# Patient Record
Sex: MALE | Race: WHITE | NOT HISPANIC OR LATINO | URBAN - METROPOLITAN AREA
[De-identification: names, ages, dates, MRNs, and addresses within clinical notes are randomized per-mention and may not be internally consistent; named-entity substitution may affect disease eponyms.]

---

## 2017-09-07 ENCOUNTER — EMERGENCY (EMERGENCY)
Facility: HOSPITAL | Age: 62
LOS: 1 days | Discharge: HOME | End: 2017-09-07
Attending: EMERGENCY MEDICINE | Admitting: EMERGENCY MEDICINE
Payer: MEDICAID

## 2017-09-07 VITALS
RESPIRATION RATE: 17 BRPM | HEART RATE: 60 BPM | SYSTOLIC BLOOD PRESSURE: 116 MMHG | WEIGHT: 175.05 LBS | OXYGEN SATURATION: 96 % | HEIGHT: 68 IN | TEMPERATURE: 99 F | DIASTOLIC BLOOD PRESSURE: 77 MMHG

## 2017-09-07 LAB
ALBUMIN SERPL ELPH-MCNC: 3.4 G/DL — SIGNIFICANT CHANGE UP (ref 3.3–5.2)
ALP SERPL-CCNC: 90 U/L — SIGNIFICANT CHANGE UP (ref 40–120)
ALT FLD-CCNC: 20 U/L — SIGNIFICANT CHANGE UP
AMPHET UR-MCNC: NEGATIVE — SIGNIFICANT CHANGE UP
ANION GAP SERPL CALC-SCNC: 12 MMOL/L — SIGNIFICANT CHANGE UP (ref 5–17)
APAP SERPL-MCNC: <7.5 UG/ML — LOW (ref 10–26)
AST SERPL-CCNC: 20 U/L — SIGNIFICANT CHANGE UP
BARBITURATES UR SCN-MCNC: NEGATIVE — SIGNIFICANT CHANGE UP
BENZODIAZ UR-MCNC: NEGATIVE — SIGNIFICANT CHANGE UP
BILIRUB SERPL-MCNC: 0.2 MG/DL — LOW (ref 0.4–2)
BUN SERPL-MCNC: 15 MG/DL — SIGNIFICANT CHANGE UP (ref 8–20)
CALCIUM SERPL-MCNC: 8.8 MG/DL — SIGNIFICANT CHANGE UP (ref 8.6–10.2)
CHLORIDE SERPL-SCNC: 100 MMOL/L — SIGNIFICANT CHANGE UP (ref 98–107)
CO2 SERPL-SCNC: 27 MMOL/L — SIGNIFICANT CHANGE UP (ref 22–29)
COCAINE METAB.OTHER UR-MCNC: NEGATIVE — SIGNIFICANT CHANGE UP
CREAT SERPL-MCNC: 0.8 MG/DL — SIGNIFICANT CHANGE UP (ref 0.5–1.3)
ETHANOL SERPL-MCNC: <10 MG/DL — SIGNIFICANT CHANGE UP
GLUCOSE SERPL-MCNC: 93 MG/DL — SIGNIFICANT CHANGE UP (ref 70–115)
HCT VFR BLD CALC: 44.5 % — SIGNIFICANT CHANGE UP (ref 42–52)
HGB BLD-MCNC: 14.8 G/DL — SIGNIFICANT CHANGE UP (ref 14–18)
MCHC RBC-ENTMCNC: 29.3 PG — SIGNIFICANT CHANGE UP (ref 27–31)
MCHC RBC-ENTMCNC: 33.3 G/DL — SIGNIFICANT CHANGE UP (ref 32–36)
MCV RBC AUTO: 88.1 FL — SIGNIFICANT CHANGE UP (ref 80–94)
METHADONE UR-MCNC: NEGATIVE — SIGNIFICANT CHANGE UP
OPIATES UR-MCNC: POSITIVE
PCP SPEC-MCNC: SIGNIFICANT CHANGE UP
PCP UR-MCNC: NEGATIVE — SIGNIFICANT CHANGE UP
PLATELET # BLD AUTO: 264 K/UL — SIGNIFICANT CHANGE UP (ref 150–400)
POTASSIUM SERPL-MCNC: 4.6 MMOL/L — SIGNIFICANT CHANGE UP (ref 3.5–5.3)
POTASSIUM SERPL-SCNC: 4.6 MMOL/L — SIGNIFICANT CHANGE UP (ref 3.5–5.3)
PROT SERPL-MCNC: 7.5 G/DL — SIGNIFICANT CHANGE UP (ref 6.6–8.7)
RBC # BLD: 5.05 M/UL — SIGNIFICANT CHANGE UP (ref 4.6–6.2)
RBC # FLD: 12.6 % — SIGNIFICANT CHANGE UP (ref 11–15.6)
SODIUM SERPL-SCNC: 139 MMOL/L — SIGNIFICANT CHANGE UP (ref 135–145)
THC UR QL: POSITIVE
WBC # BLD: 7.8 K/UL — SIGNIFICANT CHANGE UP (ref 4.8–10.8)
WBC # FLD AUTO: 7.8 K/UL — SIGNIFICANT CHANGE UP (ref 4.8–10.8)

## 2017-09-07 PROCEDURE — 80307 DRUG TEST PRSMV CHEM ANLYZR: CPT

## 2017-09-07 PROCEDURE — 99284 EMERGENCY DEPT VISIT MOD MDM: CPT

## 2017-09-07 PROCEDURE — 80053 COMPREHEN METABOLIC PANEL: CPT

## 2017-09-07 PROCEDURE — 85027 COMPLETE CBC AUTOMATED: CPT

## 2017-09-07 PROCEDURE — 36415 COLL VENOUS BLD VENIPUNCTURE: CPT

## 2017-09-07 NOTE — ED ADULT NURSE NOTE - CHIEF COMPLAINT QUOTE
Panel Management Review      Patient has the following on her problem list:     Depression / Dysthymia review  PHQ-9 SCORE 4/29/2016 9/6/2016 4/21/2017   Total Score - - -   Total Score 16 17 17      Patient is due for:  None    Hypertension   Last three blood pressure readings:  BP Readings from Last 3 Encounters:   04/21/17 130/82   09/06/16 134/84   04/29/16 114/72     Blood pressure: MONITOR    HTN Guidelines:  Age 18-59 BP range:  Less than 140/90  Age 60-85 with Diabetes:  Less than 140/90  Age 60-85 without Diabetes:  less than 150/90      Composite cancer screening  Chart review shows that this patient is due/due soon for the following Mammogram  Summary:    Patient is due/failing the following:   MAMMOGRAM    Action needed:   Patient needs referral/order: Mammo    Type of outreach:    Phone, left message for patient to call back.     Questions for provider review:    None                                                                                                                                    Kelsey Phillips MA       Chart routed to Provider .[id number]           " I smoke pot and I had a friend sneak a joint to me at the nursing home' denies complaints  states sent from nursing home for clearance. pt a and ox3 cooperative

## 2017-09-07 NOTE — ED BEHAVIORAL HEALTH NOTE - BEHAVIORAL HEALTH NOTE
RUBANotgracia: pt sent to the ED from [facility] after found smoking pot at the facility,needs medical clearance to return. Pt admits doing so, states a friend brought it. Worker called facility [phone] spoke with nurse supervisor (Salud) whom states pt can return if medically cleared. States that pt is under multiple meds wants to rule out any possible side effects. Worker informed pt's ED MD (Dr Hagan) verbalized understanding. Silvano: pt sent to the ED from [facility] after found smoking pot at the facility,needs medical clearance to return. Pt admits doing so, states a friend brought it. Worker called facility [phone] spoke with nurse supervisor (Salud) whom states pt can return if medically cleared. States that pt is under multiple meds wants to rule out any possible side effects. Worker informed pt's ED MD (Dr Masterson) verbalized understanding.

## 2017-09-07 NOTE — ED ADULT TRIAGE NOTE - CHIEF COMPLAINT QUOTE
" I smoke pot and I had a friend sneak a joint to me at the nursing home' denies complaints  states sent from nursing home for clearance. pt a and ox3 cooperative

## 2017-09-07 NOTE — ED ADULT NURSE NOTE - NS PRO AD PATIENT TYPE
Living Will Medical Orders for Life-Sustaining Treatment (MOLST)/Living Will/Do Not Resuscitate (DNR)/DNI

## 2017-09-07 NOTE — ED ADULT NURSE NOTE - OBJECTIVE STATEMENT
Assumed patient care at 2028.  Pt reports he was caught with marijuana at the nursing home and sent here.  he denies complaints and is alert and oriented X 3.

## 2017-09-07 NOTE — ED ADULT NURSE NOTE - PMH
Anxiety    Creutzfeldt Bernardo disease    DVT (deep venous thrombosis)    GERD (gastroesophageal reflux disease)

## 2017-09-08 VITALS
RESPIRATION RATE: 17 BRPM | DIASTOLIC BLOOD PRESSURE: 67 MMHG | TEMPERATURE: 99 F | HEART RATE: 64 BPM | OXYGEN SATURATION: 95 % | SYSTOLIC BLOOD PRESSURE: 121 MMHG

## 2017-09-08 NOTE — ED PROVIDER NOTE - OBJECTIVE STATEMENT
Pt comes from nursing home. States he was caught smoking marijuana. Was sent for medical clearance. Pt denies any complaints.

## 2023-01-21 ENCOUNTER — INPATIENT (INPATIENT)
Facility: HOSPITAL | Age: 68
LOS: 1 days | Discharge: HOME | DRG: 641 | End: 2023-01-23
Attending: INTERNAL MEDICINE | Admitting: STUDENT IN AN ORGANIZED HEALTH CARE EDUCATION/TRAINING PROGRAM
Payer: MEDICARE

## 2023-01-21 VITALS
HEIGHT: 71 IN | HEART RATE: 67 BPM | DIASTOLIC BLOOD PRESSURE: 71 MMHG | SYSTOLIC BLOOD PRESSURE: 111 MMHG | OXYGEN SATURATION: 98 % | TEMPERATURE: 97 F | RESPIRATION RATE: 20 BRPM | WEIGHT: 164.91 LBS

## 2023-01-21 PROBLEM — A81.00: Chronic | Status: ACTIVE | Noted: 2017-09-07

## 2023-01-21 PROBLEM — I82.409 ACUTE EMBOLISM AND THROMBOSIS OF UNSPECIFIED DEEP VEINS OF UNSPECIFIED LOWER EXTREMITY: Chronic | Status: ACTIVE | Noted: 2017-09-07

## 2023-01-21 PROBLEM — F41.9 ANXIETY DISORDER, UNSPECIFIED: Chronic | Status: ACTIVE | Noted: 2017-09-07

## 2023-01-21 PROBLEM — K21.9 GASTRO-ESOPHAGEAL REFLUX DISEASE WITHOUT ESOPHAGITIS: Chronic | Status: ACTIVE | Noted: 2017-09-07

## 2023-01-21 LAB
ALBUMIN SERPL ELPH-MCNC: 3.4 G/DL — SIGNIFICANT CHANGE UP (ref 3.3–5)
ALP SERPL-CCNC: 77 U/L — SIGNIFICANT CHANGE UP (ref 40–120)
ALT FLD-CCNC: 24 U/L — SIGNIFICANT CHANGE UP (ref 12–78)
ANION GAP SERPL CALC-SCNC: 7 MMOL/L — SIGNIFICANT CHANGE UP (ref 5–17)
APPEARANCE UR: CLEAR — SIGNIFICANT CHANGE UP
AST SERPL-CCNC: 23 U/L — SIGNIFICANT CHANGE UP (ref 15–37)
BACTERIA # UR AUTO: NEGATIVE — SIGNIFICANT CHANGE UP
BASOPHILS # BLD AUTO: 0.03 K/UL — SIGNIFICANT CHANGE UP (ref 0–0.2)
BASOPHILS NFR BLD AUTO: 0.4 % — SIGNIFICANT CHANGE UP (ref 0–2)
BILIRUB SERPL-MCNC: 0.3 MG/DL — SIGNIFICANT CHANGE UP (ref 0.2–1.2)
BILIRUB UR-MCNC: NEGATIVE — SIGNIFICANT CHANGE UP
BUN SERPL-MCNC: 16 MG/DL — SIGNIFICANT CHANGE UP (ref 7–23)
CALCIUM SERPL-MCNC: 8.9 MG/DL — SIGNIFICANT CHANGE UP (ref 8.5–10.1)
CHLORIDE SERPL-SCNC: 104 MMOL/L — SIGNIFICANT CHANGE UP (ref 96–108)
CO2 SERPL-SCNC: 27 MMOL/L — SIGNIFICANT CHANGE UP (ref 22–31)
COLOR SPEC: YELLOW — SIGNIFICANT CHANGE UP
CREAT SERPL-MCNC: 1.32 MG/DL — HIGH (ref 0.5–1.3)
DIFF PNL FLD: NEGATIVE — SIGNIFICANT CHANGE UP
EGFR: 58 ML/MIN/1.73M2 — LOW
EOSINOPHIL # BLD AUTO: 0.08 K/UL — SIGNIFICANT CHANGE UP (ref 0–0.5)
EOSINOPHIL NFR BLD AUTO: 1.2 % — SIGNIFICANT CHANGE UP (ref 0–6)
EPI CELLS # UR: SIGNIFICANT CHANGE UP
ETHANOL SERPL-MCNC: <10 MG/DL — SIGNIFICANT CHANGE UP (ref 0–10)
FLUAV AG NPH QL: SIGNIFICANT CHANGE UP
FLUBV AG NPH QL: SIGNIFICANT CHANGE UP
GLUCOSE SERPL-MCNC: 109 MG/DL — HIGH (ref 70–99)
GLUCOSE UR QL: 100 MG/DL
HCT VFR BLD CALC: 49.1 % — SIGNIFICANT CHANGE UP (ref 39–50)
HGB BLD-MCNC: 16.6 G/DL — SIGNIFICANT CHANGE UP (ref 13–17)
IMM GRANULOCYTES NFR BLD AUTO: 0.6 % — SIGNIFICANT CHANGE UP (ref 0–0.9)
KETONES UR-MCNC: ABNORMAL
LEUKOCYTE ESTERASE UR-ACNC: ABNORMAL
LIDOCAIN IGE QN: 206 U/L — SIGNIFICANT CHANGE UP (ref 73–393)
LYMPHOCYTES # BLD AUTO: 1.07 K/UL — SIGNIFICANT CHANGE UP (ref 1–3.3)
LYMPHOCYTES # BLD AUTO: 15.5 % — SIGNIFICANT CHANGE UP (ref 13–44)
MAGNESIUM SERPL-MCNC: 2.2 MG/DL — SIGNIFICANT CHANGE UP (ref 1.6–2.6)
MCHC RBC-ENTMCNC: 31.1 PG — SIGNIFICANT CHANGE UP (ref 27–34)
MCHC RBC-ENTMCNC: 33.8 GM/DL — SIGNIFICANT CHANGE UP (ref 32–36)
MCV RBC AUTO: 92.1 FL — SIGNIFICANT CHANGE UP (ref 80–100)
MONOCYTES # BLD AUTO: 0.74 K/UL — SIGNIFICANT CHANGE UP (ref 0–0.9)
MONOCYTES NFR BLD AUTO: 10.7 % — SIGNIFICANT CHANGE UP (ref 2–14)
NEUTROPHILS # BLD AUTO: 4.93 K/UL — SIGNIFICANT CHANGE UP (ref 1.8–7.4)
NEUTROPHILS NFR BLD AUTO: 71.6 % — SIGNIFICANT CHANGE UP (ref 43–77)
NITRITE UR-MCNC: NEGATIVE — SIGNIFICANT CHANGE UP
PH UR: 5 — SIGNIFICANT CHANGE UP (ref 5–8)
PLATELET # BLD AUTO: 193 K/UL — SIGNIFICANT CHANGE UP (ref 150–400)
POTASSIUM SERPL-MCNC: 4.3 MMOL/L — SIGNIFICANT CHANGE UP (ref 3.5–5.3)
POTASSIUM SERPL-SCNC: 4.3 MMOL/L — SIGNIFICANT CHANGE UP (ref 3.5–5.3)
PROT SERPL-MCNC: 6.9 GM/DL — SIGNIFICANT CHANGE UP (ref 6–8.3)
PROT UR-MCNC: NEGATIVE — SIGNIFICANT CHANGE UP
RBC # BLD: 5.33 M/UL — SIGNIFICANT CHANGE UP (ref 4.2–5.8)
RBC # FLD: 12.2 % — SIGNIFICANT CHANGE UP (ref 10.3–14.5)
RBC CASTS # UR COMP ASSIST: NEGATIVE /HPF — SIGNIFICANT CHANGE UP (ref 0–4)
RSV RNA NPH QL NAA+NON-PROBE: SIGNIFICANT CHANGE UP
SARS-COV-2 RNA SPEC QL NAA+PROBE: SIGNIFICANT CHANGE UP
SODIUM SERPL-SCNC: 138 MMOL/L — SIGNIFICANT CHANGE UP (ref 135–145)
SP GR SPEC: 1.02 — SIGNIFICANT CHANGE UP (ref 1.01–1.02)
TROPONIN I, HIGH SENSITIVITY RESULT: 6.29 NG/L — SIGNIFICANT CHANGE UP
TROPONIN I, HIGH SENSITIVITY RESULT: 6.91 NG/L — SIGNIFICANT CHANGE UP
UROBILINOGEN FLD QL: NEGATIVE — SIGNIFICANT CHANGE UP
WBC # BLD: 6.89 K/UL — SIGNIFICANT CHANGE UP (ref 3.8–10.5)
WBC # FLD AUTO: 6.89 K/UL — SIGNIFICANT CHANGE UP (ref 3.8–10.5)
WBC UR QL: SIGNIFICANT CHANGE UP /HPF (ref 0–5)

## 2023-01-21 PROCEDURE — 93306 TTE W/DOPPLER COMPLETE: CPT

## 2023-01-21 PROCEDURE — 36415 COLL VENOUS BLD VENIPUNCTURE: CPT

## 2023-01-21 PROCEDURE — 80061 LIPID PANEL: CPT

## 2023-01-21 PROCEDURE — 70450 CT HEAD/BRAIN W/O DYE: CPT | Mod: 26,MD

## 2023-01-21 PROCEDURE — 71045 X-RAY EXAM CHEST 1 VIEW: CPT | Mod: 26

## 2023-01-21 PROCEDURE — 86803 HEPATITIS C AB TEST: CPT

## 2023-01-21 PROCEDURE — 83036 HEMOGLOBIN GLYCOSYLATED A1C: CPT

## 2023-01-21 PROCEDURE — 85027 COMPLETE CBC AUTOMATED: CPT

## 2023-01-21 PROCEDURE — 99285 EMERGENCY DEPT VISIT HI MDM: CPT

## 2023-01-21 PROCEDURE — 80048 BASIC METABOLIC PNL TOTAL CA: CPT

## 2023-01-21 RX ORDER — ATORVASTATIN CALCIUM 80 MG/1
40 TABLET, FILM COATED ORAL AT BEDTIME
Refills: 0 | Status: DISCONTINUED | OUTPATIENT
Start: 2023-01-21 | End: 2023-01-23

## 2023-01-21 RX ORDER — INFLUENZA VIRUS VACCINE 15; 15; 15; 15 UG/.5ML; UG/.5ML; UG/.5ML; UG/.5ML
0.7 SUSPENSION INTRAMUSCULAR ONCE
Refills: 0 | Status: DISCONTINUED | OUTPATIENT
Start: 2023-01-21 | End: 2023-01-23

## 2023-01-21 RX ORDER — ACETAMINOPHEN 500 MG
650 TABLET ORAL EVERY 6 HOURS
Refills: 0 | Status: DISCONTINUED | OUTPATIENT
Start: 2023-01-21 | End: 2023-01-23

## 2023-01-21 RX ORDER — LANOLIN ALCOHOL/MO/W.PET/CERES
3 CREAM (GRAM) TOPICAL AT BEDTIME
Refills: 0 | Status: DISCONTINUED | OUTPATIENT
Start: 2023-01-21 | End: 2023-01-23

## 2023-01-21 RX ORDER — HEPARIN SODIUM 5000 [USP'U]/ML
5000 INJECTION INTRAVENOUS; SUBCUTANEOUS EVERY 12 HOURS
Refills: 0 | Status: DISCONTINUED | OUTPATIENT
Start: 2023-01-21 | End: 2023-01-23

## 2023-01-21 RX ORDER — ONDANSETRON 8 MG/1
4 TABLET, FILM COATED ORAL EVERY 8 HOURS
Refills: 0 | Status: DISCONTINUED | OUTPATIENT
Start: 2023-01-21 | End: 2023-01-23

## 2023-01-21 RX ORDER — NICOTINE POLACRILEX 2 MG
1 GUM BUCCAL DAILY
Refills: 0 | Status: DISCONTINUED | OUTPATIENT
Start: 2023-01-21 | End: 2023-01-23

## 2023-01-21 RX ORDER — ASPIRIN/CALCIUM CARB/MAGNESIUM 324 MG
81 TABLET ORAL DAILY
Refills: 0 | Status: DISCONTINUED | OUTPATIENT
Start: 2023-01-21 | End: 2023-01-23

## 2023-01-21 NOTE — ED PROVIDER NOTE - CONSIDERATION OF ADMISSION OBSERVATION
syncope with hx of CAD, stents, no recent cardiac eval, high risk for ACS, will admit to evaluate cardiovascularly, not safe for dc. Consideration of Admission/Observation

## 2023-01-21 NOTE — PATIENT PROFILE ADULT - FALL HARM RISK - HARM RISK INTERVENTIONS

## 2023-01-21 NOTE — PATIENT PROFILE ADULT - NSTRANSFERBELONGINGSDISPO_GEN_A_NUR
switchblade x1 brought down to security, rest of belongings (pair of sneakers x1, wallet x1, key x1, jeans x1, shirt x1, jacket x1, button up top x1) at bedside as per pt request. Pt states he does not have a cell phone or cell phone  with him./given to security/with patient

## 2023-01-21 NOTE — H&P ADULT - ASSESSMENT
HPI:  66 yo M w/ PMHx CAD s/p PCI early 90s, DVT, GERD and Creutzfeldt rice disease presents to the ED after syncopal episode. Patient states he was at his friends house and had drank two beers and smoked some marijuana, he was standing when as per his friends he suddenly collapsed with loss of consciousness, he states he also vomiting NBNB once, he thinks the vomiting happened after the syncopal episode. He does not remember the episode and denies prodromal symptoms. This is the first occurrence He currently and in the past has no chest pain, sob, palpitations, dizziness, nausea.    Surg hx: none  Social hx: alcohol and marijuana user unknown frequency, daily tobacco use for 30-40 days  Fam hx: unknown (2023 23:44)      PAST MEDICAL & SURGICAL HISTORY:  Creutzfeldt Bernardo disease      DVT (deep venous thrombosis)      Anxiety      GERD (gastroesophageal reflux disease)      No significant past surgical history      MEDICATIONS  (STANDING):  heparin   Injectable 5000 Unit(s) SubCutaneous every 12 hours  influenza  Vaccine (HIGH DOSE) 0.7 milliLiter(s) IntraMuscular once  nicotine -  14 mG/24Hr(s) Patch 1 Patch Transdermal daily    MEDICATIONS  (PRN):  acetaminophen     Tablet .. 650 milliGRAM(s) Oral every 6 hours PRN Temp greater or equal to 38C (100.4F), Mild Pain (1 - 3)  aluminum hydroxide/magnesium hydroxide/simethicone Suspension 30 milliLiter(s) Oral every 4 hours PRN Dyspepsia  melatonin 3 milliGRAM(s) Oral at bedtime PRN Insomnia  ondansetron Injectable 4 milliGRAM(s) IV Push every 8 hours PRN Nausea and/or Vomiting      Allergies    penicillin V potassium (Unknown)    Intolerances        SOCIAL HISTORY:    FAMILY HISTORY:      Vital Signs Last 24 Hrs  T(C): 36.6 (2023 22:00), Max: 37 (2023 18:53)  T(F): 97.8 (2023 22:00), Max: 98.6 (2023 18:53)  HR: 67 (2023 22:00) (64 - 69)  BP: 114/76 (2023 22:00) (111/66 - 128/76)  BP(mean): --  RR: 18 (2023 22:00) (18 - 20)  SpO2: 95% (2023 22:00) (95% - 98%)    Parameters below as of 2023 22:00  Patient On (Oxygen Delivery Method): room air    LABS:                        16.6   6.89  )-----------( 193      ( 2023 15:32 )             49.1         138  |  104  |  16  ----------------------------<  109<H>  4.3   |  27  |  1.32<H>    Ca    8.9      2023 15:32  Mg     2.2         TPro  6.9  /  Alb  3.4  /  TBili  0.3  /  DBili  x   /  AST  23  /  ALT  24  /  AlkPhos  77        Urinalysis Basic - ( 2023 19:42 )    Color: Yellow / Appearance: Clear / S.020 / pH: x  Gluc: x / Ketone: Trace  / Bili: Negative / Urobili: Negative   Blood: x / Protein: Negative / Nitrite: Negative   Leuk Esterase: Trace / RBC: Negative /HPF / WBC 3-5 /HPF   Sq Epi: x / Non Sq Epi: Occasional / Bacteria: Negative        RADIOLOGY & ADDITIONAL STUDIES:    EKG: NSR w/ 1st deg AVB      Tele: SR    Plan:    1. Cardio  -syncope without prodrome; possibly cardiogenic in setting of known IHD vs intoxication induced vs vasovagal in setting of single vomiting episode. EKG without significant conduction disease, only 1st deg AVB, tele without events thus far, UA negative for infection. Plan for 24 hour CCM, TTE to eval for SHD, cardio consult for recommendations on further work up, consider holter monitor on DC. Electrolytes optimized, patient eating and drinking, no need for IVF.     2. Social  -substance abuse; patient counselled on cessation, no signs of withdrawal. Nicotine patch prn.     dvt prophylaxis subq heparin  cardiac diet  dispo likely home tomorrow   -CAD s/p PCI as per patient 90s. Denies any chest pain, sob now or at home. Patient is not on any medications, has not seen cardiologist in many years. Start BASA and statin. Obtain lipid panel and a1c for risk stratification.   HPI:  66 yo M w/ PMHx CAD s/p PCI early 90s, DVT, GERD and Creutzfeldt rice disease presents to the ED after syncopal episode. Patient states he was at his friends house and had drank two beers and smoked some marijuana, he was standing when as per his friends he suddenly collapsed with loss of consciousness, he states he also vomiting NBNB once, he thinks the vomiting happened after the syncopal episode. He does not remember the episode and denies prodromal symptoms. This is the first occurrence He currently and in the past has no chest pain, sob, palpitations, dizziness, nausea.    Surg hx: none  Social hx: alcohol and marijuana user unknown frequency, daily tobacco use for 30-40 days  Fam hx: unknown (2023 23:44)      PAST MEDICAL & SURGICAL HISTORY:  Creutzfeldt Bernardo disease      DVT (deep venous thrombosis)      Anxiety      GERD (gastroesophageal reflux disease)      No significant past surgical history      MEDICATIONS  (STANDING):  heparin   Injectable 5000 Unit(s) SubCutaneous every 12 hours  influenza  Vaccine (HIGH DOSE) 0.7 milliLiter(s) IntraMuscular once  nicotine -  14 mG/24Hr(s) Patch 1 Patch Transdermal daily    MEDICATIONS  (PRN):  acetaminophen     Tablet .. 650 milliGRAM(s) Oral every 6 hours PRN Temp greater or equal to 38C (100.4F), Mild Pain (1 - 3)  aluminum hydroxide/magnesium hydroxide/simethicone Suspension 30 milliLiter(s) Oral every 4 hours PRN Dyspepsia  melatonin 3 milliGRAM(s) Oral at bedtime PRN Insomnia  ondansetron Injectable 4 milliGRAM(s) IV Push every 8 hours PRN Nausea and/or Vomiting      Allergies    penicillin V potassium (Unknown)    Intolerances        SOCIAL HISTORY:    FAMILY HISTORY:      Vital Signs Last 24 Hrs  T(C): 36.6 (2023 22:00), Max: 37 (2023 18:53)  T(F): 97.8 (2023 22:00), Max: 98.6 (2023 18:53)  HR: 67 (2023 22:00) (64 - 69)  BP: 114/76 (2023 22:00) (111/66 - 128/76)  BP(mean): --  RR: 18 (2023 22:00) (18 - 20)  SpO2: 95% (2023 22:00) (95% - 98%)    Parameters below as of 2023 22:00  Patient On (Oxygen Delivery Method): room air    LABS:                        16.6   6.89  )-----------( 193      ( 2023 15:32 )             49.1         138  |  104  |  16  ----------------------------<  109<H>  4.3   |  27  |  1.32<H>    Ca    8.9      2023 15:32  Mg     2.2         TPro  6.9  /  Alb  3.4  /  TBili  0.3  /  DBili  x   /  AST  23  /  ALT  24  /  AlkPhos  77        Urinalysis Basic - ( 2023 19:42 )    Color: Yellow / Appearance: Clear / S.020 / pH: x  Gluc: x / Ketone: Trace  / Bili: Negative / Urobili: Negative   Blood: x / Protein: Negative / Nitrite: Negative   Leuk Esterase: Trace / RBC: Negative /HPF / WBC 3-5 /HPF   Sq Epi: x / Non Sq Epi: Occasional / Bacteria: Negative        RADIOLOGY & ADDITIONAL STUDIES:    EKG: NSR w/ 1st deg AVB      Tele: SR    Plan:    1. Cardio  -syncope without prodrome; possibly cardiogenic in setting of known IHD vs intoxication induced vs vasovagal in setting of single vomiting episode. No audible murmur on exam, orthostatic vitals negative. EKG without significant conduction disease, only 1st deg AVB, tele without events thus far, UA negative for infection. Plan for 24 hour CCM, TTE to eval for SHD, cardio consult for recommendations on further work up, consider holter monitor on DC. Electrolytes optimized, patient eating and drinking, no need for IVF.     2. Social  -substance abuse; patient counselled on cessation, no signs of withdrawal. Nicotine patch prn.     dvt prophylaxis subq heparin  cardiac diet  dispo likely home tomorrow   -CAD s/p PCI as per patient 90s. Denies any chest pain, sob now or at home. Patient is not on any medications, has not seen cardiologist in many years. Start BASA and statin. Obtain lipid panel and a1c for risk stratification.

## 2023-01-21 NOTE — ED ADULT NURSE NOTE - NSIMPLEMENTINTERV_GEN_ALL_ED
Implemented All Universal Safety Interventions:  [place] to call system. Call bell, personal items and telephone within reach. Instruct patient to call for assistance. Room bathroom lighting operational. Non-slip footwear when patient is off stretcher. Physically safe environment: no spills, clutter or unnecessary equipment. Stretcher in lowest position, wheels locked, appropriate side rails in place.

## 2023-01-21 NOTE — H&P ADULT - NSHPREVIEWOFSYSTEMS_GEN_ALL_CORE
General: no acute distress or fatigue    Skin/Breast: no itching or pain  	  Ophthalmologic: no change in vision  	  ENMT: no difficulty swallowing  or hearing loss	    Respiratory and Thorax: no shortness of breath or cough  	  Cardiovascular: no chest pain, no palpitations    Gastrointestinal: no abdominal pain, no diarrhea or n/v    Genitourinary: no urinary retention or fq, no tenderness or dysuria    Musculoskeletal: no pain or difficulty with ambulation    Neurological: no dizziness, no confusion, change in memory, no headache  	  Hematology/Lymphatics: no swollen lymph nodes

## 2023-01-21 NOTE — PATIENT PROFILE ADULT - NSPRESCRALCFREQ_GEN_A_NUR
According to report pt fell after smoking marijuana/drinking beer, when asked pt told this writer that he does not drink and does not remember how he fell./Never

## 2023-01-21 NOTE — H&P ADULT - NSHPPHYSICALEXAM_GEN_ALL_CORE
Physical Exam  General: no acute distress, well nourished well developed  HEENT: PERRLA  Skin: dry, warm, no rashes  Neuro: aaox3, cranial nerves 1-12 wnl, no focal deficits   Neck: no masses or deformities  Cardiac: positive S1 and S2, regular rate and rhythm, no gallops/rubs/murmurs  Pulmonary: normal work of breathing, lungs clear to auscultation bilaterally  Abdomen: soft, nondistended, nontender  Extremities: no significant edema or varicosities, 2+ palpable pulses bilaterally  MSK: normal gait  Psychiatry: appropriate affect

## 2023-01-21 NOTE — ED PROVIDER NOTE - PROGRESS NOTE DETAILS
Mendoza Esquivel for attending Dr. Alejandra: updated pt on labs and results. I Ekaterina Esquivel attest that this documentation has been prepared under the direction and in the presence of Doctor Alejandra. Mendoza Esquivel for attending Dr. Alejandra: updated pt on labs and results. Patient with syncope, agreeable with observation admission to rule out cardiogenic causes of the syncope, r/o ACS.

## 2023-01-21 NOTE — ED PROVIDER NOTE - CARDIAC, MLM
Normal rate, regular rhythm.  Heart sounds S1, S2.  No murmurs, rubs or gallops. Normal rate, regular rhythm.  Heart sounds S1, S2.  No rubs or gallops. 2 + pulses in bilateral dp and radial arteries. Cap refill less than 2 seconds.

## 2023-01-21 NOTE — ED ADULT TRIAGE NOTE - CHIEF COMPLAINT QUOTE
Pt BIBEMS s/p syncopal episode. Pt stated that he passed out after smoking marijuana. Pt denies hitting his head. Pt stated that he feels like he is back to baseline. Pt denies any SOB, dizziness, or N/V at this time.

## 2023-01-21 NOTE — ED PROVIDER NOTE - CLINICAL SUMMARY MEDICAL DECISION MAKING FREE TEXT BOX
Pt with stents in heart, no recent follow up with cardiologist or PMD here after drinking and smoking marijuana. Even though hx is for benign causes but in the presence of alcohol and marijuana will r/o ACS or any other cardiac issues. Will get labs and imaging. Pt with stents in heart, no recent follow up with cardiologist or PMD here after drinking and smoking marijuana. Even though hx is possible for benign causes but in the presence of alcohol and marijuana will r/o ACS or any other cardiac issues. Will get labs and imaging.    Patient with hx of CAD, stents, syncope, high risk for cardiogenic syncope, diff includes ACS, vs. neurogenic or other causes (iatrogenic vasovagal, etc.) of syncope. Requires cardiac monit, trops, serial evaluation and potentially cardiology eval, will admit, not safe for dc.

## 2023-01-21 NOTE — ED PROVIDER NOTE - NS_ ATTENDINGSCRIBEDETAILS _ED_A_ED_FT
I Dar Alejandra MD saw and examined the patient. Scribe documented for me and under my supervision. I have modified the scribe's documentation where necessary to reflect my history, physical exam and other relevant documentations pertinent to the care of the patient.

## 2023-01-21 NOTE — H&P ADULT - HISTORY OF PRESENT ILLNESS
66 yo M w/ PMHx CAD s/p PCI early 90s, DVT, GERD and Creutzfeldt rice disease presents to the ED after syncopal episode. Patient states he was at his friends house and had drank two beers and smoked some marijuana, he was standing when as per his friends he suddenly collapsed with loss of consciousness, he states he also vomiting NBNB once, he thinks the vomiting happened after the syncopal episode. He does not remember the episode and denies prodromal symptoms. This is the first occurrence He currently and in the past has no chest pain, sob, palpitations, dizziness, nausea.    Surg hx: none  Social hx: alcohol and marijuana user unknown frequency, daily tobacco use for 30-40 days  Fam hx: unknown 66 yo M w/ PMHx CAD s/p PCI 90s, DVT, GERD and Creutzfeldt rice disease presents to the ED after syncopal episode. Patient states he was at his friends house and had drank two beers and smoked some marijuana, he was standing when as per his friends he suddenly collapsed with loss of consciousness, he states he also vomiting NBNB once, he thinks the vomiting happened after the syncopal episode. He does not remember the episode and denies prodromal symptoms. This is the first occurrence He currently and in the past has no chest pain, sob, palpitations, dizziness, nausea.    Surg hx: none  Social hx: alcohol and marijuana user unknown frequency, daily tobacco use for 30-40 days  Fam hx: unknown

## 2023-01-21 NOTE — ED ADULT NURSE REASSESSMENT NOTE - NS ED NURSE REASSESS COMMENT FT1
took report and care of patient from DIOGO Alvarado at 19:15 patient not in distress, will continue to monitor for changes.

## 2023-01-21 NOTE — ED ADULT NURSE NOTE - OBJECTIVE STATEMENT
pt presents to the ED s/p syncopal episode after smoking marijuana. pt states it was witnessed and he "threw up and then passed out." Pt is awake and alert at this time. Denies CP,SOB, dizziness, nausea, visual changes, palpitations. pt offers no current complaints at this time. pt states he was able to ambulate after incident.

## 2023-01-21 NOTE — ED PROVIDER NOTE - DIFFERENTIAL DIAGNOSIS
Patient with hx of CAD, stents, syncope, high risk for cardiogenic syncope, diff includes ACS, vs. neurogenic or other causes (iatrogenic vasovagal, etc.) of syncope. Requires cardiac monit, trops, serial evaluation and potentially cardiology eval, will admit, not safe for dc. Differential Diagnosis

## 2023-01-21 NOTE — ED PROVIDER NOTE - NSICDXPASTMEDICALHX_GEN_ALL_CORE_FT
PAST MEDICAL HISTORY:  Anxiety     Creutzfeldt Bernardo disease     DVT (deep venous thrombosis)     GERD (gastroesophageal reflux disease)

## 2023-01-21 NOTE — ED PROVIDER NOTE - OBJECTIVE STATEMENT
66 yo male w/PMHx of CAD s/p stents, DVT, GERD, Creutzfeldt Marsh Disease presents to the ED s/p witnessed syncope. Pt was with his friends and drank a couple of beers. Pt admits that he also smoked marijuana. Pt reports that he was sitting down when he had a syncopal event. Pt reports that this is the first time that he has smoked in a long time. Denies any trauma. Pt did not feel lightheaded or dizzy before the event. Denies CP, SOB. Pt does not remember how long he was out for. Pt has not followed up with a cardiologist in "a while". 68 yo male w/PMHx of CAD s/p stents, DVT, GERD, Creutzfeldt Marsh Disease presents to the ED s/p witnessed syncope. Pt was with his friends and drank a couple of beers. Pt admits that he also smoked marijuana. Pt reports that he was sitting down when he had a syncopal event. Pt reports that this is the first time that he has smoked in a long time. Denies any trauma. Pt did not feel lightheaded or dizzy before the event. Denies CP, SOB. Pt does not remember how long he was out for. Pt has not followed up with a cardiologist in "a while". No neck pain or stiffness. No abdominal pain. No interscapular pain. No recent trauma. No nausea or vomiting. No visual or focal neurological complaints. No saddle anesthesia. No incontinence of urine or stool. No difficulty ambulating. No fever or chills. No skin rash.

## 2023-01-22 DIAGNOSIS — R55 SYNCOPE AND COLLAPSE: ICD-10-CM

## 2023-01-22 DIAGNOSIS — I25.10 ATHEROSCLEROTIC HEART DISEASE OF NATIVE CORONARY ARTERY WITHOUT ANGINA PECTORIS: ICD-10-CM

## 2023-01-22 DIAGNOSIS — E78.5 HYPERLIPIDEMIA, UNSPECIFIED: ICD-10-CM

## 2023-01-22 LAB
A1C WITH ESTIMATED AVERAGE GLUCOSE RESULT: 5.3 % — SIGNIFICANT CHANGE UP (ref 4–5.6)
ANION GAP SERPL CALC-SCNC: 5 MMOL/L — SIGNIFICANT CHANGE UP (ref 5–17)
BUN SERPL-MCNC: 17 MG/DL — SIGNIFICANT CHANGE UP (ref 7–23)
CALCIUM SERPL-MCNC: 8.5 MG/DL — SIGNIFICANT CHANGE UP (ref 8.5–10.1)
CHLORIDE SERPL-SCNC: 107 MMOL/L — SIGNIFICANT CHANGE UP (ref 96–108)
CHOLEST SERPL-MCNC: 184 MG/DL — SIGNIFICANT CHANGE UP
CO2 SERPL-SCNC: 27 MMOL/L — SIGNIFICANT CHANGE UP (ref 22–31)
CREAT SERPL-MCNC: 0.98 MG/DL — SIGNIFICANT CHANGE UP (ref 0.5–1.3)
EGFR: 83 ML/MIN/1.73M2 — SIGNIFICANT CHANGE UP
ESTIMATED AVERAGE GLUCOSE: 105 MG/DL — SIGNIFICANT CHANGE UP (ref 68–114)
GLUCOSE SERPL-MCNC: 98 MG/DL — SIGNIFICANT CHANGE UP (ref 70–99)
HCT VFR BLD CALC: 46.7 % — SIGNIFICANT CHANGE UP (ref 39–50)
HCV AB S/CO SERPL IA: 0.07 S/CO — SIGNIFICANT CHANGE UP (ref 0–0.99)
HCV AB SERPL-IMP: SIGNIFICANT CHANGE UP
HDLC SERPL-MCNC: 47 MG/DL — SIGNIFICANT CHANGE UP
HGB BLD-MCNC: 15.7 G/DL — SIGNIFICANT CHANGE UP (ref 13–17)
LIPID PNL WITH DIRECT LDL SERPL: 118 MG/DL — HIGH
MCHC RBC-ENTMCNC: 30.8 PG — SIGNIFICANT CHANGE UP (ref 27–34)
MCHC RBC-ENTMCNC: 33.6 GM/DL — SIGNIFICANT CHANGE UP (ref 32–36)
MCV RBC AUTO: 91.6 FL — SIGNIFICANT CHANGE UP (ref 80–100)
NON HDL CHOLESTEROL: 137 MG/DL — HIGH
PLATELET # BLD AUTO: 191 K/UL — SIGNIFICANT CHANGE UP (ref 150–400)
POTASSIUM SERPL-MCNC: 4.3 MMOL/L — SIGNIFICANT CHANGE UP (ref 3.5–5.3)
POTASSIUM SERPL-SCNC: 4.3 MMOL/L — SIGNIFICANT CHANGE UP (ref 3.5–5.3)
RBC # BLD: 5.1 M/UL — SIGNIFICANT CHANGE UP (ref 4.2–5.8)
RBC # FLD: 12.3 % — SIGNIFICANT CHANGE UP (ref 10.3–14.5)
SODIUM SERPL-SCNC: 139 MMOL/L — SIGNIFICANT CHANGE UP (ref 135–145)
TRIGL SERPL-MCNC: 93 MG/DL — SIGNIFICANT CHANGE UP
WBC # BLD: 5.84 K/UL — SIGNIFICANT CHANGE UP (ref 3.8–10.5)
WBC # FLD AUTO: 5.84 K/UL — SIGNIFICANT CHANGE UP (ref 3.8–10.5)

## 2023-01-22 PROCEDURE — 99222 1ST HOSP IP/OBS MODERATE 55: CPT

## 2023-01-22 PROCEDURE — 12345: CPT | Mod: NC

## 2023-01-22 RX ADMIN — HEPARIN SODIUM 5000 UNIT(S): 5000 INJECTION INTRAVENOUS; SUBCUTANEOUS at 21:04

## 2023-01-22 RX ADMIN — Medication 81 MILLIGRAM(S): at 09:28

## 2023-01-22 RX ADMIN — ATORVASTATIN CALCIUM 40 MILLIGRAM(S): 80 TABLET, FILM COATED ORAL at 21:04

## 2023-01-22 NOTE — PROGRESS NOTE ADULT - ASSESSMENT
#Syncope most likely Vasovagal occurred after using alcohol and Marijuana  - tele no events  - EKG  first degree block  - troponin neg  - orthostatic neg  - Cardio consult appreciated     #CAD (coronary artery disease)/Hx of stroke as patient has old infarctions on CT head   -claims he had stents in 90 , does not take any medication over long time ,  does not want to take   -advised the patient to follow up office , take statin , ecotrin 81 mg po daily.    #Dyslipidemia.   -encourage patient to follow diet restriction   -continue statin and ASA    #Substance abuse  - patient counselled on cessation, no signs of withdrawal. Nicotine patch prn.     #dvt prophylaxis subq heparin    Dispo Possible D/C mann

## 2023-01-22 NOTE — CONSULT NOTE ADULT - PROBLEM SELECTOR RECOMMENDATION 3
LDL  encourage patient to follow diet restriction ,  continue statin , ecotrin ,       follow up as [facility] patient

## 2023-01-22 NOTE — CONSULT NOTE ADULT - SUBJECTIVE AND OBJECTIVE BOX
CHIEF COMPLAINT: passed out     HPI:  68 yo M w/ PMHx CAD s/p PCI 90s, DVT, GERD and Creutzfeldt rice disease presents to the ED after syncopal episode. Patient states he was at his friends house and had drank two beers and smoked some marijuana, he was standing when as per his friends he suddenly collapsed with loss of consciousness, he states he also vomiting NBNB once, he thinks the vomiting happened after the syncopal episode. He does not remember the episode and denies prodromal symptoms. This is the first occurrence He currently and in the past has no chest pain, sob, palpitations, dizziness, nausea.            PAST MEDICAL & SURGICAL HISTORY:  Creutzfeldt Bernardo disease  DVT (deep venous thrombosis)  Anxiety  GERD (gastroesophageal reflux disease)  No significant past surgical history  CAD     patient does not see any physician over many years         Allergies    penicillin V potassium (Unknown)    Intolerances        SOCIAL HISTORY:  hx: alcohol and marijuana user unknown frequency, daily tobacco use for 30-40 days      FAMILY HISTORY:  unknown     MEDICATIONS:  MEDICATIONS  (STANDING):  aspirin  chewable 81 milliGRAM(s) Oral daily  atorvastatin 40 milliGRAM(s) Oral at bedtime  heparin   Injectable 5000 Unit(s) SubCutaneous every 12 hours  influenza  Vaccine (HIGH DOSE) 0.7 milliLiter(s) IntraMuscular once  nicotine -  14 mG/24Hr(s) Patch 1 Patch Transdermal daily    MEDICATIONS  (PRN):  acetaminophen     Tablet .. 650 milliGRAM(s) Oral every 6 hours PRN Temp greater or equal to 38C (100.4F), Mild Pain (1 - 3)  aluminum hydroxide/magnesium hydroxide/simethicone Suspension 30 milliLiter(s) Oral every 4 hours PRN Dyspepsia  melatonin 3 milliGRAM(s) Oral at bedtime PRN Insomnia  ondansetron Injectable 4 milliGRAM(s) IV Push every 8 hours PRN Nausea and/or Vomiting      REVIEW OF SYSTEMS:    CONSTITUTIONAL: No weakness, fevers or chills  EYES/ENT: No visual changes;  No vertigo or throat pain   NECK: No pain or stiffness  RESPIRATORY: No cough, wheezing, hemoptysis; No shortness of breath  CARDIOVASCULAR: No chest pain or palpitations  GASTROINTESTINAL: No abdominal or epigastric pain. No nausea, vomiting, or hematemesis; No diarrhea or constipation. No melena or hematochezia.  GENITOURINARY: No dysuria, frequency or hematuria  NEUROLOGICAL: No numbness or weakness  SKIN: No itching, burning, rashes, or lesions   All other review of systems is negative unless indicated above    Vital Signs Last 24 Hrs  T(C): 36.9 (22 Jan 2023 08:36), Max: 37 (21 Jan 2023 18:53)  T(F): 98.4 (22 Jan 2023 08:36), Max: 98.6 (21 Jan 2023 18:53)  HR: 70 (22 Jan 2023 08:36) (64 - 70)  BP: 135/77 (22 Jan 2023 08:36) (111/66 - 135/77)  BP(mean): --  RR: 18 (22 Jan 2023 08:36) (18 - 20)  SpO2: 94% (22 Jan 2023 08:36) (94% - 98%)    Parameters below as of 22 Jan 2023 08:36  Patient On (Oxygen Delivery Method): room air        I&O's Summary      PHYSICAL EXAM:    Constitutional: NAD, awake and alert, well-developed  HEENT: PERR, EOMI,  No oral cyananosis.  Neck:  supple,  No JVD  Respiratory: Breath sounds are clear bilaterally, No wheezing, rales or rhonchi  Cardiovascular: S1 and S2, regular rate and rhythm, no Murmurs, gallops or rubs  Gastrointestinal: Bowel Sounds present, soft, nontender.   Extremities: No peripheral edema. No clubbing or cyanosis.  Vascular: 2+ peripheral pulses  Neurological: A/O x 3, no focal deficits  Musculoskeletal: no calf tenderness.  Skin: No rashes.      LABS: All Labs Reviewed:                        15.7   5.84  )-----------( 191      ( 22 Jan 2023 07:52 )             46.7                         16.6   6.89  )-----------( 193      ( 21 Jan 2023 15:32 )             49.1     22 Jan 2023 07:52    139    |  107    |  17     ----------------------------<  98     4.3     |  27     |  0.98   21 Jan 2023 15:32    138    |  104    |  16     ----------------------------<  109    4.3     |  27     |  1.32     Ca    8.5        22 Jan 2023 07:52  Ca    8.9        21 Jan 2023 15:32  Mg     2.2       21 Jan 2023 15:32    TPro  6.9    /  Alb  3.4    /  TBili  0.3    /  DBili  x      /  AST  23     /  ALT  24     /  AlkPhos  77     21 Jan 2023 15:32        - TroponinI hsT: <-6.29, <-6.91    Blood Culture:       7/22/23 sinus rhythm first degree AV block       `< from: Xray Chest 1 View- PORTABLE-Urgent (01.21.23 @ 16:00) >  IMPRESSION: No evidence of acute cardiopulmonary disease.    < end of copied text >  Lipid Profile (01.22.23 @ 07:52)   LDL Cholesterol Calculated: 118 mg/dL     monitor reviewed sinus rhythm first degree AV block  55-70s

## 2023-01-22 NOTE — CONSULT NOTE ADULT - PROBLEM SELECTOR RECOMMENDATION 9
episode occurs after having alcohol and smoking pot  likely vasovagal  dehydration related , no events on monitor   other than first degree AV block  negative troponin , no other associated symptoms

## 2023-01-22 NOTE — CONSULT NOTE ADULT - PROBLEM SELECTOR RECOMMENDATION 2
claims he had stents in 90 , does not take any medication over long time ,  does not want to take   advised the patient to make follow up office , take statin , ecotrin 81 mg po daily

## 2023-01-23 ENCOUNTER — TRANSCRIPTION ENCOUNTER (OUTPATIENT)
Age: 68
End: 2023-01-23

## 2023-01-23 VITALS
TEMPERATURE: 98 F | HEART RATE: 76 BPM | OXYGEN SATURATION: 96 % | RESPIRATION RATE: 18 BRPM | DIASTOLIC BLOOD PRESSURE: 67 MMHG | SYSTOLIC BLOOD PRESSURE: 135 MMHG

## 2023-01-23 DIAGNOSIS — R55 SYNCOPE AND COLLAPSE: ICD-10-CM

## 2023-01-23 PROCEDURE — 93306 TTE W/DOPPLER COMPLETE: CPT | Mod: 26

## 2023-01-23 PROCEDURE — 99239 HOSP IP/OBS DSCHRG MGMT >30: CPT

## 2023-01-23 PROCEDURE — 99233 SBSQ HOSP IP/OBS HIGH 50: CPT

## 2023-01-23 RX ORDER — SODIUM CHLORIDE 9 MG/ML
1000 INJECTION INTRAMUSCULAR; INTRAVENOUS; SUBCUTANEOUS
Refills: 0 | Status: DISCONTINUED | OUTPATIENT
Start: 2023-01-23 | End: 2023-01-23

## 2023-01-23 RX ORDER — ASPIRIN/CALCIUM CARB/MAGNESIUM 324 MG
1 TABLET ORAL
Qty: 30 | Refills: 0
Start: 2023-01-23

## 2023-01-23 RX ORDER — NICOTINE POLACRILEX 2 MG
14 GUM BUCCAL
Qty: 30 | Refills: 0
Start: 2023-01-23

## 2023-01-23 RX ORDER — ATORVASTATIN CALCIUM 80 MG/1
1 TABLET, FILM COATED ORAL
Qty: 30 | Refills: 0
Start: 2023-01-23

## 2023-01-23 RX ADMIN — SODIUM CHLORIDE 500 MILLILITER(S): 9 INJECTION INTRAMUSCULAR; INTRAVENOUS; SUBCUTANEOUS at 13:09

## 2023-01-23 RX ADMIN — Medication 81 MILLIGRAM(S): at 09:34

## 2023-01-23 NOTE — DISCHARGE NOTE NURSING/CASE MANAGEMENT/SOCIAL WORK - PATIENT PORTAL LINK FT
You can access the FollowMyHealth Patient Portal offered by [facility] by registering at the following website: [email or web address]. By joining [employer]’s FollowMyHealth portal, you will also be able to view your health information using other applications (apps) compatible with our system.

## 2023-01-23 NOTE — PROGRESS NOTE ADULT - PROBLEM SELECTOR PLAN 1
episode occurs after having alcohol and smoking pot  likely vasovagal  dehydration related , no events on monitor   other than first degree AV block  negative troponin , no other associated symptoms.    If Echo normal, stable to discharge from cardiology standpoint. will sign off

## 2023-01-23 NOTE — DISCHARGE NOTE PROVIDER - NSDCCPCAREPLAN_GEN_ALL_CORE_FT
PRINCIPAL DISCHARGE DIAGNOSIS  Diagnosis: Syncope  Assessment and Plan of Treatment: prevent recurrence  stay hydrated.   avoid etoh/marijuana.         SECONDARY DISCHARGE DIAGNOSES  Diagnosis: CAD (coronary artery disease)  Assessment and Plan of Treatment: take aspirin/atorvastatin.

## 2023-01-23 NOTE — DISCHARGE NOTE PROVIDER - HOSPITAL COURSE
68 yo M w/ PMHx CAD s/p PCI 90s, DVT, GERD and Creutzfeldt rice disease presents to the ED after syncopal episode. Patient states he was at his friends house and had drank two beers and smoked some marijuana, he was standing when as per his friends he suddenly collapsed with loss of consciousness, he states he also vomiting NBNB once, he thinks the vomiting happened after the syncopal episode. He does not remember the episode and denies prodromal symptoms. This is the first occurrence He currently and in the past has no chest pain, sob, palpitations, dizziness, nausea. He was admitted for further management.   Found to be orthostatic by pulse.   Given ivf with improvement. Tele negative for significant arrhythmia. 2Decho showed mild AI/aortic root dilation.   He is medically stable for dc home.   He is advised to stay hydrated, avoid etoh.     Vital Signs Last 24 Hrs  T(C): 36.5 (2023 08:31), Max: 36.9 (2023 16:06)  T(F): 97.7 (2023 08:31), Max: 98.5 (2023 16:06)  HR: 68 (2023 08:31) (62 - 68)  BP: 140/71 (2023 08:31) (133/86 - 140/71)  BP(mean): 98 (2023 00:10) (98 - 98)  RR: 18 (2023 08:31) (18 - 18)  SpO2: 95% (2023 08:31) (95% - 97%)    Parameters below as of 2023 08:31  Patient On (Oxygen Delivery Method): room air      PHYSICAL EXAM:    GENERAL: Comfortable, no acute distress   HEAD:  Normocephalic, atraumatic  EYES: EOMI, PERRLA  HEENT: Moist mucous membranes  NECK: Supple, No JVD  NERVOUS SYSTEM:  Alert & Oriented X3, Motor Strength 5/5 B/L upper and lower extremities  CHEST/LUNG: Clear to auscultation bilaterally  HEART: Regular rate and rhythm  ABDOMEN: Soft, Nontender, Nondistended, Bowel sounds present  GENITOURINARY: Voiding, no palpable bladder  EXTREMITIES:   No clubbing, cyanosis, or edema  MUSCULOSKELETAL- No muscle tenderness, no joint tenderness  SKIN-no rash    LABS:                        15.7   5.84  )-----------( 191      ( 2023 07:52 )             46.7         139  |  107  |  17  ----------------------------<  98  4.3   |  27  |  0.98    Ca    8.5      2023 07:52  Mg     2.2         TPro  6.9  /  Alb  3.4  /  TBili  0.3  /  DBili  x   /  AST  23  /  ALT  24  /  AlkPhos  77        Urinalysis Basic - ( 2023 19:42 )    Color: Yellow / Appearance: Clear / S.020 / pH: x  Gluc: x / Ketone: Trace  / Bili: Negative / Urobili: Negative   Blood: x / Protein: Negative / Nitrite: Negative   Leuk Esterase: Trace / RBC: Negative /HPF / WBC 3-5 /HPF   Sq Epi: x / Non Sq Epi: Occasional / Bacteria: Negative      CT Head No Cont (23 @ 18:06) >  IMPRESSION:   mild anterior periventricular white matter ischemia. 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 Mild temporal lobe atrophy.      FINAL DIAGNOSIS:    #Syncope most likely Vasovagal occurred after using alcohol and Marijuana  #Orthostatic by pulse->d/t dehydration, improved with ivf.   #CAD (coronary artery disease)/Hx of stroke as patient has old infarctions on CT head   -claims he had stents in  , does not take any medication over long time ,  does not want to take   -advised the patient to follow up office , take statin , ecotrin 81 mg po daily.  #Dyslipidemia.   #Substance abuse  time taken for dc 41 min  d/w pt  summary to be faxed to pcp.

## 2023-01-23 NOTE — DISCHARGE NOTE PROVIDER - CARE PROVIDER_API CALL
Solomon Painter)  Cardiology  [address] [place] [address]  Phone: [phone]  Fax: [phone]  Follow Up Time: 1 week

## 2023-01-23 NOTE — PROGRESS NOTE ADULT - SUBJECTIVE AND OBJECTIVE BOX
HOSPITALIST PROGRESS NOTE:  SUBJECTIVE:  PCP:  Chief Complaint: Patient is a 67y old  Male who presents with a chief complaint of syncope (2023 12:37)      HPI:  68 yo M w/ PMHx CAD s/p PCI 90s, DVT, GERD and Creutzfeldt rice disease presents to the ED after syncopal episode. Patient states he was at his friends house and had drank two beers and smoked some marijuana, he was standing when as per his friends he suddenly collapsed with loss of consciousness, he states he also vomiting NBNB once, he thinks the vomiting happened after the syncopal episode. He does not remember the episode and denies prodromal symptoms. This is the first occurrence He currently and in the past has no chest pain, sob, palpitations, dizziness, nausea.    Surg hx: none  Social hx: alcohol and marijuana user unknown frequency, daily tobacco use for 30-40 days  Fam hx: unknown (2023 23:44)    : Above reviewed; Patient has no complaints. Orthostatic was negative; hasnt seen a PCP in many years; he doesnt feel up to leaving today and wishes to stay to get his TTE mann      Allergies:  penicillin V potassium (Unknown)    REVIEW OF SYSTEMS:  See HPI. All other review of systems is negative unless indicated above.     OBJECTIVE  Physical Exam:  Vital Signs:  Height (cm): 180.3 ( @ 14:52)  Weight (kg): 87.4 ( @ 22:00)  BMI (kg/m2): 26.9 ( @ 22:00)  BSA (m2): 2.08 ( @ 22:00)  Vital Signs Last 24 Hrs  T(C): 36.9 (2023 08:36), Max: 37 (2023 18:53)  T(F): 98.4 (2023 08:36), Max: 98.6 (2023 18:53)  HR: 70 (2023 08:36) (64 - 70)  BP: 135/77 (2023 08:36) (111/66 - 135/77)  BP(mean): --  RR: 18 (2023 08:36) (18 - 20)  SpO2: 94% (2023 08:36) (94% - 98%)    Parameters below as of 2023 08:36  Patient On (Oxygen Delivery Method): room air      I&O's Summary      Constitutional: NAD, awake and alert  Neurological: AAO x 3, no focal deficits  HEENT: PERRLA, EOMI, MMM  Neck: Soft and supple, No LAD, No JVD  Respiratory: Breath sounds are clear bilaterally, No wheezing, rales or rhonchi  Cardiovascular: S1 and S2, regular rate and rhythm; no Murmurs, gallops or rubs  Gastrointestinal: Bowel Sounds present, soft, nontender, nondistended, no guarding, no rebound tenderness  Back: No CVA tenderness   Extremities: No peripheral edema  Vascular: 2+ peripheral pulses  Musculoskeletal: 5/5 strength b/l upper and lower extremities  Skin: No rashes  Breast: Deferred  Rectal: Deferred    MEDICATIONS  (STANDING):  aspirin  chewable 81 milliGRAM(s) Oral daily  atorvastatin 40 milliGRAM(s) Oral at bedtime  heparin   Injectable 5000 Unit(s) SubCutaneous every 12 hours  influenza  Vaccine (HIGH DOSE) 0.7 milliLiter(s) IntraMuscular once  nicotine -  14 mG/24Hr(s) Patch 1 Patch Transdermal daily      LABS: All Labs Reviewed:                        15.7   5.84  )-----------( 191      ( 2023 07:52 )             46.7     -    139  |  107  |  17  ----------------------------<  98  4.3   |  27  |  0.98    Ca    8.5      2023 07:52  Mg     2.2         TPro  6.9  /  Alb  3.4  /  TBili  0.3  /  DBili  x   /  AST  23  /  ALT  24  /  AlkPhos  77        Urinalysis Basic - ( 2023 19:42 )    Color: Yellow / Appearance: Clear / S.020 / pH: x  Gluc: x / Ketone: Trace  / Bili: Negative / Urobili: Negative   Blood: x / Protein: Negative / Nitrite: Negative   Leuk Esterase: Trace / RBC: Negative /HPF / WBC 3-5 /HPF   Sq Epi: x / Non Sq Epi: Occasional / Bacteria: Negative      RADIOLOGY/EKG:    < from: Xray Chest 1 View- PORTABLE-Urgent (23 @ 16:00) >    IMPRESSION: No evidence of acute cardiopulmonary disease.      < end of copied text >  < from: CT Head No Cont (23 @ 18:06) >    IMPRESSION:   mild anterior periventricular white matter ischemia. 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 Mild temporal lobe atrophy.      < end of copied text >          
CHIEF COMPLAINT: passed out     HPI:  66 yo M w/ PMHx CAD s/p PCI 90s, DVT, GERD and Creutzfeldt rice disease presents to the ED after syncopal episode. Patient states he was at his friends house and had drank two beers and smoked some marijuana, he was standing when as per his friends he suddenly collapsed with loss of consciousness, he states he also vomiting NBNB once, he thinks the vomiting happened after the syncopal episode. He does not remember the episode and denies prodromal symptoms. This is the first occurrence He currently and in the past has no chest pain, sob, palpitations, dizziness, nausea.  Cardiology consulted to evaluate for syncope.    1/23/23: pt. denies any chest pain/SOB/palpitations, feels well. Tele: NSR 70s, no events; if Echo normal will sign off     MEDICATIONS:  MEDICATIONS  (STANDING):  aspirin  chewable 81 milliGRAM(s) Oral daily  atorvastatin 40 milliGRAM(s) Oral at bedtime  heparin   Injectable 5000 Unit(s) SubCutaneous every 12 hours  influenza  Vaccine (HIGH DOSE) 0.7 milliLiter(s) IntraMuscular once  nicotine -  14 mG/24Hr(s) Patch 1 Patch Transdermal daily    MEDICATIONS  (PRN):  acetaminophen     Tablet .. 650 milliGRAM(s) Oral every 6 hours PRN Temp greater or equal to 38C (100.4F), Mild Pain (1 - 3)  aluminum hydroxide/magnesium hydroxide/simethicone Suspension 30 milliLiter(s) Oral every 4 hours PRN Dyspepsia  melatonin 3 milliGRAM(s) Oral at bedtime PRN Insomnia  ondansetron Injectable 4 milliGRAM(s) IV Push every 8 hours PRN Nausea and/or Vomiting    Vital Signs Last 24 Hrs  T(C): 36.5 (23 Jan 2023 08:31), Max: 36.9 (22 Jan 2023 16:06)  T(F): 97.7 (23 Jan 2023 08:31), Max: 98.5 (22 Jan 2023 16:06)  HR: 68 (23 Jan 2023 08:31) (62 - 68)  BP: 140/71 (23 Jan 2023 08:31) (133/86 - 140/71)  BP(mean): 98 (23 Jan 2023 00:10) (98 - 98)  RR: 18 (23 Jan 2023 08:31) (18 - 18)  SpO2: 95% (23 Jan 2023 08:31) (95% - 97%)    Parameters below as of 23 Jan 2023 08:31  Patient On (Oxygen Delivery Method): room air      PHYSICAL EXAM:    Constitutional: NAD, awake and alert, well-developed  HEENT: PERR, EOMI,  No oral cyananosis.  Neck:  supple,  No JVD  Respiratory: Breath sounds are clear bilaterally, No wheezing, rales or rhonchi  Cardiovascular: S1 and S2, regular rate and rhythm, no Murmurs, gallops or rubs  Gastrointestinal: Bowel Sounds present, soft, nontender.   Extremities: No peripheral edema. No clubbing or cyanosis.  Vascular: 2+ peripheral pulses  Neurological: A/O x 3, no focal deficits  Musculoskeletal: no calf tenderness.  Skin: No rashes.      LABS: All Labs Reviewed:             Vital Signs Last 24 Hrs  T(C): 36.5 (23 Jan 2023 08:31), Max: 36.9 (22 Jan 2023 16:06)  T(F): 97.7 (23 Jan 2023 08:31), Max: 98.5 (22 Jan 2023 16:06)  HR: 68 (23 Jan 2023 08:31) (62 - 68)  BP: 140/71 (23 Jan 2023 08:31) (133/86 - 140/71)  BP(mean): 98 (23 Jan 2023 00:10) (98 - 98)  RR: 18 (23 Jan 2023 08:31) (18 - 18)  SpO2: 95% (23 Jan 2023 08:31) (95% - 97%)    Parameters below as of 23 Jan 2023 08:31  Patient On (Oxygen Delivery Method): room air                 15.7   5.84  )-----------( 191      ( 22 Jan 2023 07:52 )             46.7                         16.6   6.89  )-----------( 193      ( 21 Jan 2023 15:32 )             49.1     22 Jan 2023 07:52    139    |  107    |  17     ----------------------------<  98     4.3     |  27     |  0.98   21 Jan 2023 15:32    138    |  104    |  16     ----------------------------<  109    4.3     |  27     |  1.32     Ca    8.5        22 Jan 2023 07:52  Ca    8.9        21 Jan 2023 15:32  Mg     2.2       21 Jan 2023 15:32    TPro  6.9    /  Alb  3.4    /  TBili  0.3    /  DBili  x      /  AST  23     /  ALT  24     /  AlkPhos  77     21 Jan 2023 15:32        - TroponinI hsT: <-6.29, <-6.91    Blood Culture:       7/22/23 sinus rhythm first degree AV block       `< from: Xray Chest 1 View- PORTABLE-Urgent (01.21.23 @ 16:00) >  IMPRESSION: No evidence of acute cardiopulmonary disease.    < end of copied text >  Lipid Profile (01.22.23 @ 07:52)   LDL Cholesterol Calculated: 118 mg/dL     monitor reviewed sinus rhythm first degree AV block  55-70s

## 2023-01-23 NOTE — DISCHARGE NOTE PROVIDER - NSDCMRMEDTOKEN_GEN_ALL_CORE_FT
aspirin 81 mg oral tablet, chewable: 1 tab(s) orally once a day  atorvastatin 40 mg oral tablet: 1 tab(s) orally once a day (at bedtime)  nicotine 14 mg/24 hr transdermal film, extended release: 14 mg/24h transdermal once a day

## 2023-01-23 NOTE — PROGRESS NOTE ADULT - PROBLEM SELECTOR PLAN 2
CAD (coronary artery disease).   ·  Recommendation: claims he had stents in 90 , does not take any medication over long time ,  does not want to take   advised the patient to make follow up office , take statin , ecotrin 81 mg po daily.

## 2023-01-27 DIAGNOSIS — I77.819 AORTIC ECTASIA, UNSPECIFIED SITE: ICD-10-CM

## 2023-01-27 DIAGNOSIS — Z88.0 ALLERGY STATUS TO PENICILLIN: ICD-10-CM

## 2023-01-27 DIAGNOSIS — E86.0 DEHYDRATION: ICD-10-CM

## 2023-01-27 DIAGNOSIS — F10.90 ALCOHOL USE, UNSPECIFIED, UNCOMPLICATED: ICD-10-CM

## 2023-01-27 DIAGNOSIS — F12.90 CANNABIS USE, UNSPECIFIED, UNCOMPLICATED: ICD-10-CM

## 2023-01-27 DIAGNOSIS — I44.0 ATRIOVENTRICULAR BLOCK, FIRST DEGREE: ICD-10-CM

## 2023-01-27 DIAGNOSIS — Z95.5 PRESENCE OF CORONARY ANGIOPLASTY IMPLANT AND GRAFT: ICD-10-CM

## 2023-01-27 DIAGNOSIS — A81.00 CREUTZFELDT-JAKOB DISEASE, UNSPECIFIED: ICD-10-CM

## 2023-01-27 DIAGNOSIS — F02.84 DEMENTIA IN OTHER DISEASES CLASSIFIED ELSEWHERE, UNSPECIFIED SEVERITY, WITH ANXIETY: ICD-10-CM

## 2023-01-27 DIAGNOSIS — I25.10 ATHEROSCLEROTIC HEART DISEASE OF NATIVE CORONARY ARTERY WITHOUT ANGINA PECTORIS: ICD-10-CM

## 2023-01-27 DIAGNOSIS — Z86.718 PERSONAL HISTORY OF OTHER VENOUS THROMBOSIS AND EMBOLISM: ICD-10-CM

## 2023-01-27 DIAGNOSIS — R55 SYNCOPE AND COLLAPSE: ICD-10-CM

## 2023-01-27 DIAGNOSIS — E78.5 HYPERLIPIDEMIA, UNSPECIFIED: ICD-10-CM

## 2023-01-27 DIAGNOSIS — K21.9 GASTRO-ESOPHAGEAL REFLUX DISEASE WITHOUT ESOPHAGITIS: ICD-10-CM

## 2023-01-27 DIAGNOSIS — F19.10 OTHER PSYCHOACTIVE SUBSTANCE ABUSE, UNCOMPLICATED: ICD-10-CM

## 2024-03-15 ENCOUNTER — APPOINTMENT (OUTPATIENT)
Dept: NEUROLOGY | Facility: CLINIC | Age: 69
End: 2024-03-15
